# Patient Record
Sex: FEMALE | Race: WHITE | URBAN - METROPOLITAN AREA
[De-identification: names, ages, dates, MRNs, and addresses within clinical notes are randomized per-mention and may not be internally consistent; named-entity substitution may affect disease eponyms.]

---

## 2023-01-19 ENCOUNTER — OFFICE (OUTPATIENT)
Dept: URBAN - METROPOLITAN AREA CLINIC 109 | Facility: CLINIC | Age: 73
Setting detail: OPHTHALMOLOGY
End: 2023-01-19
Payer: MEDICARE

## 2023-01-19 DIAGNOSIS — H04.123: ICD-10-CM

## 2023-01-19 DIAGNOSIS — H02.822: ICD-10-CM

## 2023-01-19 DIAGNOSIS — H40.053: ICD-10-CM

## 2023-01-19 DIAGNOSIS — H25.13: ICD-10-CM

## 2023-01-19 PROBLEM — H02.835 DERMATOCHALASIS; RIGHT UPPER LID, RIGHT LOWER LID, LEFT UPPER LID, LEFT LOWER LID: Status: ACTIVE | Noted: 2023-01-19

## 2023-01-19 PROBLEM — H02.831 DERMATOCHALASIS; RIGHT UPPER LID, RIGHT LOWER LID, LEFT UPPER LID, LEFT LOWER LID: Status: ACTIVE | Noted: 2023-01-19

## 2023-01-19 PROBLEM — H02.832 DERMATOCHALASIS; RIGHT UPPER LID, RIGHT LOWER LID, LEFT UPPER LID, LEFT LOWER LID: Status: ACTIVE | Noted: 2023-01-19

## 2023-01-19 PROBLEM — H02.834 DERMATOCHALASIS; RIGHT UPPER LID, RIGHT LOWER LID, LEFT UPPER LID, LEFT LOWER LID: Status: ACTIVE | Noted: 2023-01-19

## 2023-01-19 PROCEDURE — 92250 FUNDUS PHOTOGRAPHY W/I&R: CPT | Performed by: OPHTHALMOLOGY

## 2023-01-19 PROCEDURE — 92020 GONIOSCOPY: CPT | Performed by: OPHTHALMOLOGY

## 2023-01-19 PROCEDURE — 92014 COMPRE OPH EXAM EST PT 1/>: CPT | Performed by: OPHTHALMOLOGY

## 2023-01-19 PROCEDURE — 92083 EXTENDED VISUAL FIELD XM: CPT | Performed by: OPHTHALMOLOGY

## 2023-01-19 ASSESSMENT — REFRACTION_CURRENTRX
OD_ADD: +2.50
OD_CYLINDER: -0.50
OS_OVR_VA: 20/
OS_CYLINDER: -0.75
OS_ADD: +2.50
OS_AXIS: 84
OD_SPHERE: -0.25
OD_OVR_VA: 20/
OD_AXIS: 87
OS_SPHERE: +0.25

## 2023-01-19 ASSESSMENT — REFRACTION_AUTOREFRACTION
OD_SPHERE: +1.50
OS_CYLINDER: -1.25
OS_AXIS: 74
OS_SPHERE: +0.75
OD_AXIS: 90
OD_CYLINDER: -1.50

## 2023-01-19 ASSESSMENT — SPHEQUIV_DERIVED
OS_SPHEQUIV: -0.25
OS_SPHEQUIV: 0.125
OD_SPHEQUIV: 0.5
OD_SPHEQUIV: 0.75

## 2023-01-19 ASSESSMENT — PACHYMETRY
OD_CT_UM: 525
OD_CT_CORRECTION: 1
OS_CT_CORRECTION: 2
OS_CT_UM: 517

## 2023-01-19 ASSESSMENT — REFRACTION_MANIFEST
OD_ADD: +2.50
OD_CYLINDER: -0.50
OD_SPHERE: +0.75
OS_ADD: +2.50
OD_VA1: 20/20
OS_CYLINDER: -1.00
OS_VA1: 20/20-
OD_AXIS: 90
OS_AXIS: 90
OS_SPHERE: +0.25

## 2023-01-19 ASSESSMENT — TONOMETRY
OD_IOP_MMHG: 21
OS_IOP_MMHG: 21

## 2023-01-19 ASSESSMENT — LID POSITION - DERMATOCHALASIS
OD_DERMATOCHALASIS: 1+
OS_DERMATOCHALASIS: 1+

## 2023-01-19 ASSESSMENT — CONFRONTATIONAL VISUAL FIELD TEST (CVF)
OS_FINDINGS: FULL
OD_FINDINGS: FULL

## 2023-01-19 ASSESSMENT — VISUAL ACUITY
OS_BCVA: 20/20-2
OD_BCVA: 20/25

## 2023-01-30 ENCOUNTER — APPOINTMENT (OUTPATIENT)
Dept: CT IMAGING | Facility: CLINIC | Age: 73
End: 2023-01-30
Payer: SELF-PAY

## 2023-01-30 PROBLEM — Z00.00 ENCOUNTER FOR PREVENTIVE HEALTH EXAMINATION: Status: ACTIVE | Noted: 2023-01-30

## 2023-01-30 PROCEDURE — 75571 CT HRT W/O DYE W/CA TEST: CPT

## 2023-02-13 ENCOUNTER — OUTPATIENT (OUTPATIENT)
Dept: OUTPATIENT SERVICES | Facility: HOSPITAL | Age: 73
LOS: 1 days | End: 2023-02-13
Payer: MEDICARE

## 2023-02-13 ENCOUNTER — APPOINTMENT (OUTPATIENT)
Dept: MAMMOGRAPHY | Facility: CLINIC | Age: 73
End: 2023-02-13
Payer: MEDICARE

## 2023-02-13 ENCOUNTER — APPOINTMENT (OUTPATIENT)
Dept: ULTRASOUND IMAGING | Facility: CLINIC | Age: 73
End: 2023-02-13
Payer: MEDICARE

## 2023-02-13 ENCOUNTER — APPOINTMENT (OUTPATIENT)
Dept: RADIOLOGY | Facility: CLINIC | Age: 73
End: 2023-02-13
Payer: MEDICARE

## 2023-02-13 ENCOUNTER — TRANSCRIPTION ENCOUNTER (OUTPATIENT)
Age: 73
End: 2023-02-13

## 2023-02-13 DIAGNOSIS — Z00.8 ENCOUNTER FOR OTHER GENERAL EXAMINATION: ICD-10-CM

## 2023-02-13 PROCEDURE — 76641 ULTRASOUND BREAST COMPLETE: CPT | Mod: 26,50,GZ

## 2023-02-13 PROCEDURE — 77067 SCR MAMMO BI INCL CAD: CPT | Mod: 26

## 2023-02-13 PROCEDURE — 76641 ULTRASOUND BREAST COMPLETE: CPT

## 2023-02-13 PROCEDURE — 77080 DXA BONE DENSITY AXIAL: CPT | Mod: 26

## 2023-02-13 PROCEDURE — 77063 BREAST TOMOSYNTHESIS BI: CPT | Mod: 26

## 2023-02-13 PROCEDURE — 77067 SCR MAMMO BI INCL CAD: CPT

## 2023-02-13 PROCEDURE — 77080 DXA BONE DENSITY AXIAL: CPT

## 2023-02-13 PROCEDURE — 77063 BREAST TOMOSYNTHESIS BI: CPT

## 2023-03-06 ENCOUNTER — APPOINTMENT (OUTPATIENT)
Dept: PULMONOLOGY | Facility: CLINIC | Age: 73
End: 2023-03-06
Payer: MEDICARE

## 2023-03-06 VITALS
BODY MASS INDEX: 24.8 KG/M2 | HEART RATE: 80 BPM | TEMPERATURE: 98.7 F | DIASTOLIC BLOOD PRESSURE: 99 MMHG | SYSTOLIC BLOOD PRESSURE: 147 MMHG | WEIGHT: 140 LBS | HEIGHT: 63 IN | OXYGEN SATURATION: 99 %

## 2023-03-06 DIAGNOSIS — R91.1 SOLITARY PULMONARY NODULE: ICD-10-CM

## 2023-03-06 DIAGNOSIS — Z23 ENCOUNTER FOR IMMUNIZATION: ICD-10-CM

## 2023-03-06 PROCEDURE — 99204 OFFICE O/P NEW MOD 45 MIN: CPT

## 2023-03-06 NOTE — CONSULT LETTER
[Dear  ___] : Dear  [unfilled], [Consult Letter:] : I had the pleasure of evaluating your patient, [unfilled]. [Please see my note below.] : Please see my note below. [Consult Closing:] : Thank you very much for allowing me to participate in the care of this patient.  If you have any questions, please do not hesitate to contact me. [FreeTextEntry3] : Sincerely,\par \par Kerry Rosales MD\par [facility]\par Pulmonary Medicine\par tel: [phone]\par fax: [phone]\par

## 2023-03-06 NOTE — ASSESSMENT
[FreeTextEntry1] : Ms. KAY SWIFT is a 72 year old otherwise healthy woman, never smoker is here for evaluation\par \par #Pulmonary nodules -noted incidentally on CT coronaries.  Imaging reviewed and discussed with patient.  Visualized portions of the lung are notable for 3 and 6 mm nodules in the RUL. \par Recommend repeat imaging in about 6 months. \par \par All questions answered. Patient in agreement with plan. \par Follow up after repeat imaging

## 2023-03-06 NOTE — HISTORY OF PRESENT ILLNESS
[Never] : never [TextBox_4] : Ms. KAY SWIFT is a 72 year old otherwise healthy woman, never smoker is here for evaluation\par \par Had CT coronaries done, was found to have 2 pulmonary nodules, measuring up to 6 mm in size\par Denies hx of pulmonary issues. Hx of mild COVID Bessy 2021.\par Currently feels well from respiratory standpoint.  Denies cough, wheezing, shortness of breath.  She walks and does not dancing exercise class regularly\par \par \par PMH: none\par Meds: none\par All:  ?\par SH: never smoker; no known exposures\par FH: Denies family hx of pulmonary or autoimmune disease\par PMD: ELIAS LAST\par Immunizations: Pneumococcal vaccine\par

## 2023-07-06 ENCOUNTER — APPOINTMENT (OUTPATIENT)
Dept: CT IMAGING | Facility: CLINIC | Age: 73
End: 2023-07-06
Payer: MEDICARE

## 2023-07-06 PROCEDURE — 71250 CT THORAX DX C-: CPT

## 2023-07-18 ENCOUNTER — APPOINTMENT (OUTPATIENT)
Dept: PULMONOLOGY | Facility: CLINIC | Age: 73
End: 2023-07-18
Payer: MEDICARE

## 2023-07-18 PROCEDURE — 99442: CPT | Mod: 95

## 2023-07-20 ENCOUNTER — OFFICE (OUTPATIENT)
Dept: URBAN - METROPOLITAN AREA CLINIC 109 | Facility: CLINIC | Age: 73
Setting detail: OPHTHALMOLOGY
End: 2023-07-20
Payer: MEDICARE

## 2023-07-20 DIAGNOSIS — H40.053: ICD-10-CM

## 2023-07-20 DIAGNOSIS — H02.834: ICD-10-CM

## 2023-07-20 DIAGNOSIS — H04.123: ICD-10-CM

## 2023-07-20 DIAGNOSIS — H02.835: ICD-10-CM

## 2023-07-20 DIAGNOSIS — H02.822: ICD-10-CM

## 2023-07-20 DIAGNOSIS — H25.13: ICD-10-CM

## 2023-07-20 DIAGNOSIS — H02.831: ICD-10-CM

## 2023-07-20 DIAGNOSIS — H02.832: ICD-10-CM

## 2023-07-20 PROCEDURE — 99213 OFFICE O/P EST LOW 20 MIN: CPT | Performed by: OPHTHALMOLOGY

## 2023-07-20 PROCEDURE — 92133 CPTRZD OPH DX IMG PST SGM ON: CPT | Performed by: OPHTHALMOLOGY

## 2023-07-20 ASSESSMENT — REFRACTION_MANIFEST
OS_CYLINDER: -1.00
OD_ADD: +2.50
OS_SPHERE: +0.25
OD_SPHERE: +0.75
OD_CYLINDER: -0.50
OS_VA1: 20/20-
OS_ADD: +2.50
OD_VA1: 20/20
OS_AXIS: 90
OD_AXIS: 90

## 2023-07-20 ASSESSMENT — SPHEQUIV_DERIVED
OD_SPHEQUIV: 0.5
OS_SPHEQUIV: -0.25
OS_SPHEQUIV: 0.125
OD_SPHEQUIV: 0.75

## 2023-07-20 ASSESSMENT — REFRACTION_CURRENTRX
OS_OVR_VA: 20/
OS_ADD: +2.50
OD_CYLINDER: -0.50
OS_AXIS: 84
OD_ADD: +2.50
OD_AXIS: 87
OS_SPHERE: +0.25
OD_SPHERE: -0.25
OD_OVR_VA: 20/
OS_CYLINDER: -0.75

## 2023-07-20 ASSESSMENT — REFRACTION_AUTOREFRACTION
OD_CYLINDER: -1.50
OS_AXIS: 74
OD_SPHERE: +1.50
OD_AXIS: 90
OS_CYLINDER: -1.25
OS_SPHERE: +0.75

## 2023-07-20 ASSESSMENT — TONOMETRY
OD_IOP_MMHG: 19
OS_IOP_MMHG: 19

## 2023-07-20 ASSESSMENT — PACHYMETRY
OD_CT_UM: 525
OS_CT_CORRECTION: 2
OD_CT_CORRECTION: 1
OS_CT_UM: 517

## 2023-07-20 ASSESSMENT — LID POSITION - DERMATOCHALASIS
OS_DERMATOCHALASIS: 1+
OD_DERMATOCHALASIS: 1+

## 2023-07-20 ASSESSMENT — CONFRONTATIONAL VISUAL FIELD TEST (CVF)
OD_FINDINGS: FULL
OS_FINDINGS: FULL

## 2023-07-20 ASSESSMENT — VISUAL ACUITY
OD_BCVA: 20/20-2
OS_BCVA: 20/20-1

## 2024-01-24 ENCOUNTER — OFFICE (OUTPATIENT)
Dept: URBAN - METROPOLITAN AREA CLINIC 109 | Facility: CLINIC | Age: 74
Setting detail: OPHTHALMOLOGY
End: 2024-01-24
Payer: MEDICARE

## 2024-01-24 DIAGNOSIS — H40.053: ICD-10-CM

## 2024-01-24 DIAGNOSIS — H02.835: ICD-10-CM

## 2024-01-24 DIAGNOSIS — H02.832: ICD-10-CM

## 2024-01-24 DIAGNOSIS — H02.831: ICD-10-CM

## 2024-01-24 DIAGNOSIS — H40.033: ICD-10-CM

## 2024-01-24 DIAGNOSIS — H02.822: ICD-10-CM

## 2024-01-24 DIAGNOSIS — H25.13: ICD-10-CM

## 2024-01-24 DIAGNOSIS — H04.123: ICD-10-CM

## 2024-01-24 DIAGNOSIS — H02.834: ICD-10-CM

## 2024-01-24 PROCEDURE — 92083 EXTENDED VISUAL FIELD XM: CPT | Performed by: OPHTHALMOLOGY

## 2024-01-24 PROCEDURE — 92014 COMPRE OPH EXAM EST PT 1/>: CPT | Performed by: OPHTHALMOLOGY

## 2024-01-24 PROCEDURE — 92250 FUNDUS PHOTOGRAPHY W/I&R: CPT | Performed by: OPHTHALMOLOGY

## 2024-01-24 PROCEDURE — 92020 GONIOSCOPY: CPT | Performed by: OPHTHALMOLOGY

## 2024-01-24 ASSESSMENT — REFRACTION_AUTOREFRACTION
OS_SPHERE: +1.75
OD_SPHERE: +1.50
OD_CYLINDER: -1.75
OS_AXIS: 90
OS_CYLINDER: -2.00
OD_AXIS: 94

## 2024-01-24 ASSESSMENT — REFRACTION_CURRENTRX
OD_OVR_VA: 20/
OS_AXIS: 84
OD_AXIS: 87
OD_SPHERE: -0.25
OS_ADD: +2.50
OS_CYLINDER: -0.75
OD_CYLINDER: -0.50
OS_SPHERE: +0.25
OS_OVR_VA: 20/
OD_ADD: +2.50

## 2024-01-24 ASSESSMENT — REFRACTION_MANIFEST
OD_CYLINDER: -0.50
OD_ADD: +2.50
OS_ADD: +2.50
OS_VA1: 20/20-
OD_VA1: 20/20
OD_SPHERE: +0.75
OS_SPHERE: +0.25
OS_CYLINDER: -1.00
OS_AXIS: 90
OD_AXIS: 90

## 2024-01-24 ASSESSMENT — CONFRONTATIONAL VISUAL FIELD TEST (CVF)
OD_FINDINGS: FULL
OS_FINDINGS: FULL

## 2024-01-24 ASSESSMENT — SPHEQUIV_DERIVED
OS_SPHEQUIV: 0.75
OD_SPHEQUIV: 0.5
OD_SPHEQUIV: 0.625
OS_SPHEQUIV: -0.25

## 2024-01-24 ASSESSMENT — LID POSITION - DERMATOCHALASIS
OS_DERMATOCHALASIS: 1+
OD_DERMATOCHALASIS: 1+

## 2024-02-14 ENCOUNTER — OUTPATIENT (OUTPATIENT)
Dept: OUTPATIENT SERVICES | Facility: HOSPITAL | Age: 74
LOS: 1 days | End: 2024-02-14
Payer: MEDICARE

## 2024-02-14 ENCOUNTER — APPOINTMENT (OUTPATIENT)
Dept: ULTRASOUND IMAGING | Facility: CLINIC | Age: 74
End: 2024-02-14
Payer: MEDICARE

## 2024-02-14 ENCOUNTER — APPOINTMENT (OUTPATIENT)
Dept: MAMMOGRAPHY | Facility: CLINIC | Age: 74
End: 2024-02-14
Payer: MEDICARE

## 2024-02-14 DIAGNOSIS — Z00.8 ENCOUNTER FOR OTHER GENERAL EXAMINATION: ICD-10-CM

## 2024-02-14 PROCEDURE — 77063 BREAST TOMOSYNTHESIS BI: CPT | Mod: 26

## 2024-02-14 PROCEDURE — 77067 SCR MAMMO BI INCL CAD: CPT | Mod: 26

## 2024-02-14 PROCEDURE — 77067 SCR MAMMO BI INCL CAD: CPT

## 2024-02-14 PROCEDURE — 77063 BREAST TOMOSYNTHESIS BI: CPT

## 2024-02-14 PROCEDURE — 76641 ULTRASOUND BREAST COMPLETE: CPT | Mod: 26,50,GY

## 2024-02-14 PROCEDURE — 76641 ULTRASOUND BREAST COMPLETE: CPT

## 2024-02-22 ENCOUNTER — APPOINTMENT (OUTPATIENT)
Dept: MAMMOGRAPHY | Facility: CLINIC | Age: 74
End: 2024-02-22
Payer: MEDICARE

## 2024-02-22 ENCOUNTER — OUTPATIENT (OUTPATIENT)
Dept: OUTPATIENT SERVICES | Facility: HOSPITAL | Age: 74
LOS: 1 days | End: 2024-02-22
Payer: MEDICARE

## 2024-02-22 ENCOUNTER — APPOINTMENT (OUTPATIENT)
Dept: ULTRASOUND IMAGING | Facility: CLINIC | Age: 74
End: 2024-02-22
Payer: MEDICARE

## 2024-02-22 DIAGNOSIS — Z00.8 ENCOUNTER FOR OTHER GENERAL EXAMINATION: ICD-10-CM

## 2024-02-22 PROCEDURE — 76641 ULTRASOUND BREAST COMPLETE: CPT | Mod: 26,LT,3G

## 2024-02-22 PROCEDURE — 77065 DX MAMMO INCL CAD UNI: CPT | Mod: 26,LT

## 2024-02-22 PROCEDURE — 76641 ULTRASOUND BREAST COMPLETE: CPT

## 2024-02-22 PROCEDURE — G0279: CPT | Mod: 26

## 2024-02-22 PROCEDURE — 77065 DX MAMMO INCL CAD UNI: CPT

## 2024-02-22 PROCEDURE — G0279: CPT

## 2024-07-24 ENCOUNTER — OFFICE (OUTPATIENT)
Dept: URBAN - METROPOLITAN AREA CLINIC 109 | Facility: CLINIC | Age: 74
Setting detail: OPHTHALMOLOGY
End: 2024-07-24
Payer: MEDICARE

## 2024-07-24 DIAGNOSIS — H02.831: ICD-10-CM

## 2024-07-24 DIAGNOSIS — H02.822: ICD-10-CM

## 2024-07-24 DIAGNOSIS — H04.123: ICD-10-CM

## 2024-07-24 DIAGNOSIS — H40.053: ICD-10-CM

## 2024-07-24 DIAGNOSIS — H25.13: ICD-10-CM

## 2024-07-24 DIAGNOSIS — H02.832: ICD-10-CM

## 2024-07-24 DIAGNOSIS — H02.834: ICD-10-CM

## 2024-07-24 DIAGNOSIS — H02.835: ICD-10-CM

## 2024-07-24 DIAGNOSIS — H40.033: ICD-10-CM

## 2024-07-24 PROCEDURE — 92133 CPTRZD OPH DX IMG PST SGM ON: CPT | Performed by: OPHTHALMOLOGY

## 2024-07-24 PROCEDURE — 99213 OFFICE O/P EST LOW 20 MIN: CPT | Performed by: OPHTHALMOLOGY

## 2024-07-24 ASSESSMENT — CONFRONTATIONAL VISUAL FIELD TEST (CVF)
OD_FINDINGS: FULL
OS_FINDINGS: FULL

## 2024-07-24 ASSESSMENT — LID POSITION - DERMATOCHALASIS
OD_DERMATOCHALASIS: 1+
OS_DERMATOCHALASIS: 1+

## 2025-01-23 ENCOUNTER — OFFICE (OUTPATIENT)
Dept: URBAN - METROPOLITAN AREA CLINIC 109 | Facility: CLINIC | Age: 75
Setting detail: OPHTHALMOLOGY
End: 2025-01-23
Payer: MEDICARE

## 2025-01-23 ENCOUNTER — RX ONLY (RX ONLY)
Age: 75
End: 2025-01-23

## 2025-01-23 DIAGNOSIS — H40.033: ICD-10-CM

## 2025-01-23 DIAGNOSIS — H04.123: ICD-10-CM

## 2025-01-23 DIAGNOSIS — H25.13: ICD-10-CM

## 2025-01-23 DIAGNOSIS — H02.832: ICD-10-CM

## 2025-01-23 DIAGNOSIS — H02.835: ICD-10-CM

## 2025-01-23 DIAGNOSIS — H40.053: ICD-10-CM

## 2025-01-23 DIAGNOSIS — H02.834: ICD-10-CM

## 2025-01-23 DIAGNOSIS — H02.831: ICD-10-CM

## 2025-01-23 DIAGNOSIS — H02.822: ICD-10-CM

## 2025-01-23 PROCEDURE — 92014 COMPRE OPH EXAM EST PT 1/>: CPT | Performed by: OPHTHALMOLOGY

## 2025-01-23 PROCEDURE — 92250 FUNDUS PHOTOGRAPHY W/I&R: CPT | Performed by: OPHTHALMOLOGY

## 2025-01-23 PROCEDURE — 92020 GONIOSCOPY: CPT | Performed by: OPHTHALMOLOGY

## 2025-01-23 PROCEDURE — 92083 EXTENDED VISUAL FIELD XM: CPT | Performed by: OPHTHALMOLOGY

## 2025-01-23 ASSESSMENT — REFRACTION_CURRENTRX
OD_SPHERE: +0.75
OS_SPHERE: +0.50
OD_ADD: +2.50
OD_VPRISM_DIRECTION: PROGS
OS_AXIS: 087
OS_VPRISM_DIRECTION: PROGS
OS_ADD: +2.50
OD_AXIS: 086
OS_OVR_VA: 20/
OS_CYLINDER: -1.00
OD_OVR_VA: 20/
OD_CYLINDER: -0.50

## 2025-01-23 ASSESSMENT — REFRACTION_AUTOREFRACTION
OD_SPHERE: +1.00
OS_AXIS: 097
OS_CYLINDER: -1.50
OD_AXIS: 094
OS_SPHERE: +1.25
OD_CYLINDER: -1.50

## 2025-01-23 ASSESSMENT — REFRACTION_MANIFEST
OS_AXIS: 100
OD_ADD: +2.50
OD_AXIS: 095
OD_CYLINDER: -0.50
OS_CYLINDER: -1.00
OD_AXIS: 90
OS_ADD: +2.50
OD_CYLINDER: -1.50
OS_AXIS: 90
OS_CYLINDER: -1.50
OS_VA1: 20/20-
OD_SPHERE: +1.00
OD_ADD: +2.50
OS_SPHERE: +1.25
OD_VA1: 20/20-2
OD_SPHERE: +0.75
OD_VA1: 20/20
OS_SPHERE: +0.25
OS_ADD: +2.50
OS_VA1: 20/20-

## 2025-01-23 ASSESSMENT — PACHYMETRY
OS_CT_CORRECTION: 2
OD_CT_UM: 525
OS_CT_UM: 517
OD_CT_CORRECTION: 1

## 2025-01-23 ASSESSMENT — KERATOMETRY
OS_K1POWER_DIOPTERS: 43.00
OD_K1POWER_DIOPTERS: 42.75
OS_K2POWER_DIOPTERS: 43.50
OD_K2POWER_DIOPTERS: 43.50
OS_AXISANGLE_DEGREES: 164
OD_AXISANGLE_DEGREES: 028

## 2025-01-23 ASSESSMENT — CONFRONTATIONAL VISUAL FIELD TEST (CVF)
OS_FINDINGS: FULL
OD_FINDINGS: FULL

## 2025-01-23 ASSESSMENT — LID POSITION - DERMATOCHALASIS
OS_DERMATOCHALASIS: 1+
OD_DERMATOCHALASIS: 1+

## 2025-01-23 ASSESSMENT — VISUAL ACUITY
OD_BCVA: 20/30+2
OS_BCVA: 20/30-2

## 2025-01-23 ASSESSMENT — TONOMETRY
OD_IOP_MMHG: 19
OS_IOP_MMHG: 20
OS_IOP_MMHG: 21
OD_IOP_MMHG: 21

## 2025-03-18 ENCOUNTER — APPOINTMENT (OUTPATIENT)
Dept: RADIOLOGY | Facility: CLINIC | Age: 75
End: 2025-03-18
Payer: MEDICARE

## 2025-03-18 ENCOUNTER — OUTPATIENT (OUTPATIENT)
Dept: OUTPATIENT SERVICES | Facility: HOSPITAL | Age: 75
LOS: 1 days | End: 2025-03-18
Payer: MEDICARE

## 2025-03-18 ENCOUNTER — APPOINTMENT (OUTPATIENT)
Dept: ULTRASOUND IMAGING | Facility: CLINIC | Age: 75
End: 2025-03-18
Payer: MEDICARE

## 2025-03-18 ENCOUNTER — APPOINTMENT (OUTPATIENT)
Dept: MAMMOGRAPHY | Facility: CLINIC | Age: 75
End: 2025-03-18
Payer: MEDICARE

## 2025-03-18 DIAGNOSIS — Z00.8 ENCOUNTER FOR OTHER GENERAL EXAMINATION: ICD-10-CM

## 2025-03-18 PROCEDURE — 76641 ULTRASOUND BREAST COMPLETE: CPT | Mod: 26,50,GA

## 2025-03-18 PROCEDURE — 77067 SCR MAMMO BI INCL CAD: CPT

## 2025-03-18 PROCEDURE — 77063 BREAST TOMOSYNTHESIS BI: CPT | Mod: 26

## 2025-03-18 PROCEDURE — 76641 ULTRASOUND BREAST COMPLETE: CPT

## 2025-03-18 PROCEDURE — 77080 DXA BONE DENSITY AXIAL: CPT | Mod: 26

## 2025-03-18 PROCEDURE — 77067 SCR MAMMO BI INCL CAD: CPT | Mod: 26

## 2025-03-18 PROCEDURE — 77063 BREAST TOMOSYNTHESIS BI: CPT

## 2025-03-18 PROCEDURE — 77080 DXA BONE DENSITY AXIAL: CPT

## 2025-07-24 ENCOUNTER — OFFICE (OUTPATIENT)
Dept: URBAN - METROPOLITAN AREA CLINIC 109 | Facility: CLINIC | Age: 75
Setting detail: OPHTHALMOLOGY
End: 2025-07-24
Payer: MEDICARE

## 2025-07-24 DIAGNOSIS — H40.033: ICD-10-CM

## 2025-07-24 DIAGNOSIS — H02.835: ICD-10-CM

## 2025-07-24 DIAGNOSIS — H02.821: ICD-10-CM

## 2025-07-24 DIAGNOSIS — H02.832: ICD-10-CM

## 2025-07-24 DIAGNOSIS — H02.822: ICD-10-CM

## 2025-07-24 DIAGNOSIS — H02.834: ICD-10-CM

## 2025-07-24 DIAGNOSIS — H02.831: ICD-10-CM

## 2025-07-24 DIAGNOSIS — H25.13: ICD-10-CM

## 2025-07-24 DIAGNOSIS — H40.053: ICD-10-CM

## 2025-07-24 DIAGNOSIS — H04.123: ICD-10-CM

## 2025-07-24 PROCEDURE — 92133 CPTRZD OPH DX IMG PST SGM ON: CPT | Performed by: OPHTHALMOLOGY

## 2025-07-24 PROCEDURE — 99213 OFFICE O/P EST LOW 20 MIN: CPT | Performed by: OPHTHALMOLOGY

## 2025-07-24 ASSESSMENT — CONFRONTATIONAL VISUAL FIELD TEST (CVF)
OD_FINDINGS: FULL
OS_FINDINGS: FULL

## 2025-07-24 ASSESSMENT — KERATOMETRY
OS_K2POWER_DIOPTERS: 43.50
OD_AXISANGLE_DEGREES: 028
OD_K2POWER_DIOPTERS: 43.50
OS_AXISANGLE_DEGREES: 164
OS_K1POWER_DIOPTERS: 43.00
OD_K1POWER_DIOPTERS: 42.75

## 2025-07-24 ASSESSMENT — REFRACTION_MANIFEST
OS_VA1: 20/20-
OD_AXIS: 095
OD_CYLINDER: -1.50
OD_CYLINDER: -0.50
OS_AXIS: 100
OS_AXIS: 90
OS_ADD: +2.50
OD_AXIS: 90
OD_SPHERE: +0.75
OD_VA1: 20/20
OD_SPHERE: +1.00
OS_ADD: +2.50
OS_SPHERE: +0.25
OS_SPHERE: +1.25
OS_CYLINDER: -1.00
OD_VA1: 20/20-2
OS_VA1: 20/20-
OD_ADD: +2.50
OD_ADD: +2.50
OS_CYLINDER: -1.50

## 2025-07-24 ASSESSMENT — VISUAL ACUITY
OS_BCVA: 20/25+2
OD_BCVA: 20/25-2

## 2025-07-24 ASSESSMENT — TONOMETRY
OS_IOP_MMHG: 19
OS_IOP_MMHG: 18
OD_IOP_MMHG: 16
OD_IOP_MMHG: 19

## 2025-07-24 ASSESSMENT — REFRACTION_AUTOREFRACTION
OD_AXIS: 90
OD_SPHERE: +1.75
OD_CYLINDER: -2.00
OS_SPHERE: +2.00
OS_AXIS: 100
OS_CYLINDER: -2.25

## 2025-07-24 ASSESSMENT — PACHYMETRY
OS_CT_CORRECTION: 2
OD_CT_UM: 525
OD_CT_CORRECTION: 1
OS_CT_UM: 517

## 2025-07-24 ASSESSMENT — REFRACTION_CURRENTRX
OS_SPHERE: +0.50
OS_CYLINDER: -1.00
OD_VPRISM_DIRECTION: PROGS
OD_CYLINDER: -0.50
OS_OVR_VA: 20/
OS_AXIS: 087
OD_SPHERE: +0.75
OD_AXIS: 086
OD_OVR_VA: 20/
OD_ADD: +2.50
OS_VPRISM_DIRECTION: PROGS
OS_ADD: +2.50

## 2025-07-24 ASSESSMENT — LID POSITION - DERMATOCHALASIS
OD_DERMATOCHALASIS: 1+
OS_DERMATOCHALASIS: 1+